# Patient Record
Sex: FEMALE | URBAN - METROPOLITAN AREA
[De-identification: names, ages, dates, MRNs, and addresses within clinical notes are randomized per-mention and may not be internally consistent; named-entity substitution may affect disease eponyms.]

---

## 2024-06-19 ENCOUNTER — APPOINTMENT (RX ONLY)
Dept: URBAN - METROPOLITAN AREA CLINIC 337 | Facility: CLINIC | Age: 29
Setting detail: DERMATOLOGY
End: 2024-06-19

## 2024-06-19 DIAGNOSIS — L82.1 OTHER SEBORRHEIC KERATOSIS: ICD-10-CM

## 2024-06-19 DIAGNOSIS — D18.0 HEMANGIOMA: ICD-10-CM

## 2024-06-19 DIAGNOSIS — L259 CONTACT DERMATITIS AND OTHER ECZEMA, UNSPECIFIED CAUSE: ICD-10-CM | Status: INADEQUATELY CONTROLLED

## 2024-06-19 DIAGNOSIS — D22 MELANOCYTIC NEVI: ICD-10-CM

## 2024-06-19 DIAGNOSIS — L81.4 OTHER MELANIN HYPERPIGMENTATION: ICD-10-CM

## 2024-06-19 PROBLEM — D22.5 MELANOCYTIC NEVI OF TRUNK: Status: ACTIVE | Noted: 2024-06-19

## 2024-06-19 PROBLEM — D18.01 HEMANGIOMA OF SKIN AND SUBCUTANEOUS TISSUE: Status: ACTIVE | Noted: 2024-06-19

## 2024-06-19 PROBLEM — L23.9 ALLERGIC CONTACT DERMATITIS, UNSPECIFIED CAUSE: Status: ACTIVE | Noted: 2024-06-19

## 2024-06-19 PROCEDURE — ? TREATMENT REGIMEN

## 2024-06-19 PROCEDURE — ? COUNSELING

## 2024-06-19 PROCEDURE — ? DEFER

## 2024-06-19 PROCEDURE — ? FULL BODY SKIN EXAM

## 2024-06-19 PROCEDURE — 99203 OFFICE O/P NEW LOW 30 MIN: CPT

## 2024-06-19 PROCEDURE — ? ADDITIONAL NOTES

## 2024-06-19 ASSESSMENT — LOCATION DETAILED DESCRIPTION DERM
LOCATION DETAILED: LEFT DISTAL MEDIAL POSTERIOR UPPER ARM
LOCATION DETAILED: SUPERIOR LUMBAR SPINE
LOCATION DETAILED: SUPERIOR THORACIC SPINE
LOCATION DETAILED: RIGHT DISTAL POSTERIOR UPPER ARM
LOCATION DETAILED: INFERIOR THORACIC SPINE

## 2024-06-19 ASSESSMENT — LOCATION SIMPLE DESCRIPTION DERM
LOCATION SIMPLE: RIGHT POSTERIOR UPPER ARM
LOCATION SIMPLE: LOWER BACK
LOCATION SIMPLE: UPPER BACK
LOCATION SIMPLE: LEFT POSTERIOR UPPER ARM

## 2024-06-19 ASSESSMENT — PAIN INTENSITY VAS: HOW INTENSE IS YOUR PAIN 0 BEING NO PAIN, 10 BEING THE MOST SEVERE PAIN POSSIBLE?: NO PAIN

## 2024-06-19 ASSESSMENT — LOCATION ZONE DERM
LOCATION ZONE: TRUNK
LOCATION ZONE: ARM

## 2024-06-19 ASSESSMENT — ITCH NUMERIC RATING SCALE: HOW SEVERE IS YOUR ITCHING?: 3

## 2024-06-19 ASSESSMENT — SEVERITY ASSESSMENT: SEVERITY: MILD

## 2024-06-19 ASSESSMENT — BSA RASH: BSA RASH: 5

## 2024-06-19 NOTE — PROCEDURE: ADDITIONAL NOTES
Additional Notes: Pt reports after sunscreen application on her arms and face, she started to experience red itchy bumps. Reports today itching is a 2 out 10 and deferred topical steroids. Sunscreen samples given to patient at today's visit. Advised pt to test first on her inner arm, if she experiences another rash then consider patch testing at her next visit.
Render Risk Assessment In Note?: no
Detail Level: Simple

## 2024-06-19 NOTE — PROCEDURE: DEFER
Other Procedure: Allergy testing
Detail Level: Detailed
X Size Of Lesion In Cm (Optional): 0
Introduction Text (Please End With A Colon): The following procedure was deferred:patch testing

## 2024-11-19 NOTE — PROCEDURE: FULL BODY SKIN EXAM
Diabetes Care Specialist Follow-up Note  Author: Marla Stein RD, CDE  Date: 11/19/2024    Intake    Program Intake  Reason for Diabetes Program Visit:: Intervention  Type of Intervention:: Individual  Individual: Device Training  Device Training: Personal CGM  Current diabetes risk level:: high  In the last month, have you used the ER or been admitted to the hospital: No  Permission to speak with others about care:: no    Current Diabetes Treatment: Insulin, DM Injectables, Oral Medications  Oral Medication Type/Dose: farxiga 10 mg once a day  DM Injectables Type/Dose: ozempic 0.5 mg once a week  Method of insulin delivery?: Insulin Device  Insulin Device Type/Dose: humalog via VGo30    Continuous Glucose Monitoring  Patient has CGM: Yes  Personal CGM type:: Dexcom G6    Lab Results   Component Value Date    HGBA1C 8.3 (H) 11/07/2024     A1c Pre Diabetes Care Specialist Intervention:  8.7%    Physical activity/Exercise:   No change    SMBG: using the dexcom G6/G7 - patient has both the G6 and G7 receivers, states she finally got the G7 sensors, in the box the new dexcom G7 prescription is the  not sensors. Patient has a transmitter for the G6 and sensors with a prescription label from 11/23/2023 on them. She is less confused today  She is finally feeling better after having covid  The G6  is not charged, charged and reprogrammed at time of visit, this  does not have recent data  Patient has the new G7  and not the one we used last visit so I am unable to download sensor data to review    Lifestyle Coping Support & Clinical    Lifestyle/Coping/Support  Compared to other people your age, how would you rate your health?: Good  Psychosocial/Coping Skills Assessment Completed: : No  Assessment indicates:: Adequate understanding  Deffered due to:: Other (comment) (previously completed, there has been no change and patient has adequate understanding)  Area of need?: No    Problem 
Price (Do Not Change): 0.00
Review  Active Comorbidities: Cardiovascular Disease, Hypertension, Neuropathy    Diabetes Self-Management Skills Assessment    Medication Skills Assessment  Patient is able to identify current diabetes medications, dosages, and appropriate timing of medications.: yes  Patient reports problems or concerns with current medication regimen.: no  Medication regimen problems/concerns:: lack of training  Medication Skills Assessment Completed:: No  Assessment indicates:: Adequate understanding  Deffered due to:: Other (comment) (previously completed, there has been no change and patient has adequate understanding)  Area of need?: No    Diabetes Disease Process/Treatment Options  Diabetes Type?: Type II  Diabetes Disease Process/Treatment Options: Skills Assessment Completed: No  Assessment indicates:: Adequate understanding  Deferred due to:: Other (comment)  Area of need?: No    Nutrition/Healthy Eating  Meal Plan 24 Hour Recall - Breakfast: Toast and Eggs, coffee with sugar and cream  Meal Plan 24 Hour Recall - Lunch: chicken noodle soup with crackers or a sandwich  Meal Plan 24 Hour Recall - Dinner: yesterday some spaghetti and meatsauce, some times roast and potatoes, some times a burger and some fries, sometimes gets pizza or something home cooked  Meal Plan 24 Hour Recall - Snack: peanut butter crackers, almonds, cheerios  Meal Plan 24 Hour Recall - Beverage: water  Who shops/cooks?: self  Patient can identify foods that impact blood sugar.: yes  Challenges to healthy eating:: portion control, snacking between meals and at night, eating when feeling depressed/stressed, lack of will power, eating out, going to parties  Nutrition/Healthy Eating Skills Assessment Completed:: No  Assessment indicates:: Adequate understanding  Deffered due to:: Other (comment)  Area of need?: No    Physical Activity/Exercise  Physical Activity/Exercise Skills Assessment Completed: : No  Assessment indicates:: Adequate 
Detail Level: Simple
understanding  Deffered due to:: Other (comment)  Area of need?: No    Home Blood Glucose Monitoring  Patient states that blood sugar is checked at home daily.: yes  Monitoring Method:: personal continuous glucose monitor  Personal CGM type:: Dexcom G6  What is your A1c Target?: 7.0 with no hypoglycemia  Home Blood Glucose Monitoring Skills Assessment Completed: : Yes  Assessment indicates:: Instruction Needed  Area of need?: Yes    Acute Complications  Acute Complications Skills Assessment Completed: : No  Assessment indicates:: Adequate understanding  Deffered due to:: Other (comment)  Area of need?: No    Chronic Complications  Chronic Complications Skills Assessment Completed: : No  Assessment indicates:: Adequate understanding  Deferred due to:: Other (comment)  Area of need?: No      During today's follow-up visit,  the following areas required further assessment and content was provided/reviewed.    Based on today's diabetes care assessment, the following areas of need were identified:          7/26/2024    12:01 AM   Areas of Need   Medications/Current Diabetes Treatment No   Lifestyle Coping Support No   Diabetes Disease Process/Treatment Options No   Nutrition/Healthy Eating No   Physical Activity/Exercise No   Home Blood Glucose Monitoring Yes, changed out the dexcom G7 sensor and started a dexcom G7 sensor   Acute Complications No   Chronic Complications No        Today's interventions were provided through individual discussion, instruction, and written materials were provided.    Patient verbalized understanding of instruction and written materials.  Pt was able to return back demonstration of instructions today. Patient understood key points, needs reinforcement and further instruction.     Diabetes Self-Management Care Plan Review and Evaluation of Progress:    During today's follow-up Agustin Diabetes Self-Management Care Plan progress was reviewed and progress was evaluated including his/her 
"input. Aishwarya has agreed to continue his/her journey to improve/maintain overall diabetes control by continuing to set health goals. See care plan progress below.      Care Plan: Diabetes Management   Updates made since 11/20/2023 12:00 AM        Problem: Blood Glucose Self-Monitoring         Goal: Patient agrees to check and record blood sugars 3 times per day using the dexcom G6/G7 for the next 3 months    Start Date: 12/27/2023   Expected End Date: 3/27/2024   This Visit's Progress: On track   Recent Progress: Not met   Priority: High   Barriers: Lack of Supplies   Note:    Patient was instructed how to use a meter. Discussed signs and symptoms, and causes and treatment of hypoglycemia. Covered blood sugar and A1C goals. Instructed patient to use provided logbooks to record blood sugars. Instructed patient to follow-up with their physician. Patient verbalized understanding of all instructions.  Prescription for meter and testing supplies pended to PCP    1/6/2023  Dexcom G6 Education  Patient is here in clinic today for initial start of Dexcom continuous glucose monitoring system (CGMA). Reviewed with patient how to insert sensor and transmitter. Patient inserted sensor on right abdomen and inserted transmitter. Time and date was set on monitor and settings were set. Hypoglycemia trigger set for 70 and hyperglycemia set for 300. Patient provided with phone number for 24-hour help line if needed. Discussed various types of possible alarms and what to do. Questions addressed. Initialization finished. No futher questions.  Patient referred to clinic today for Dexcom G6 continuous glucose sensor system training.  Education was provided using "Quick Start Guide" per Dexcom protocol.    Overview:  5min glucose reading updates, trending arrows, BG graph screens, battery life indicator, Blue Tooth Symbol.  Menus: trend Graph, start sensor, enter BG, events, Alerts, Settings, Shutdown, Stop Sensor   Settings:    "
Instructions: This plan will send the code FBSE to the PM system.  DO NOT or CHANGE the price.
                      * Urgent Low: 55 mg/dL                          * Urgent Low Soon: Off                          * Low alert: 70                          * High alert: 300                          * Rise rate: Off                          * Fall Rate: Off                          * Signal Loss: 30 min                          * No Reading: Off                          * Always sound: On                             Reviewed additional setting options with patient, including Graph Height and Transmitter ID. Transmitter was paired with .    Reviewed where to find sensor insertion time and sensor expiration date.   Discussed no need to calibrate sensor during the entirety of the 10 day wear. Discussed that pt can calibrate sensor if desired, but at that time she will need to continue calibrating every 12 hours for sensor to remain accurate. Reviewed appropriate calibration techniques.  Reviewed sensor site selection. Site selected and prepped using aseptic technique Inserted to left abdomen. Transmitter placed in pod and secured.  Practiced sensor pod/transmitter removal from site, and removal of transmitter from sensor pod.  Patient able to demonstrate without difficulty.  Encouraged to review manual prior to starting another sensor.   Reviewed problem solving aspects of sensor transmission/variables that can disrupt RF transmission.  range 20 feet, but the first 3 hrs keep within 3 feet of transmitter.  Pt instructed on lag time of interstitial fluid from CBG and was advised to tx hypoglycemia and dose insulin based on SMBG values.  Link to video provided and written instructions provided for patient to review before 10 day sensor change  Dexcom technical support contact number given and examples of when to contact them discussed.      1/17 Changing the Sensor (every 10 days)  Remove the whole thing from your body  Hold the sensor by the skinny part and break the clear piece  Remove the gray 
transmitter  In the reader touch start sensor  Touch yes you want to start sensor  Touch the square you want to put the number in for   There will be a 4-digit code on the sensor (like 5937)  Enter the code from the sensor  Touch ok  Touch yes, it is correct  Insert Sensor  Remove orange piece and paper tabs  Place on skin and hold in place  Push the orange button  Insert the gray transmitter into the sensor  Put the small tongue like side in first and push down  You should hear 2 clicks  Touch confirm once sensor is secure on body  Touch start Sensor  Touch 1 then 2  Sensor will take 2 hours to warm up    Changing Transmitter (every 90 days) in the reader  Touch the =   Touch transmitter  Touch pair new  Go through the process to start sensor  Touch yes you want to start sensor  Touch the square you want to put the number in for   There will be a 4-digit code on the sensor (like 5937)  Enter the code from the sensor  Touch ok  Touch yes, it is correct  Touch the square you want to put the number in for   There will be a 6-digit code on the transmitter box (like [id number])  Enter the code from the transmitter box  Touch ok  Touch yes, it is correct  Insert Sensor  Remove orange piece and paper tabs  Place on skin and hold in place  Push the orange button  Insert the gray transmitter into the sensor  Put the small tongue like side in first and push down  You should hear 2 clicks  Touch confirm once sensor is secure on body  Touch start Sensor  Touch 1 then 2  Sensor will take 2 hours to warm up             Follow Up Plan     Follow up in about 10 days (around 11/28/2024) for Change Personal CGM and Upload Data.    Today's care plan and follow up schedule was discussed with patient.  Aishwarya verbalized understanding of the care plan, goals, and agrees to follow up plan.        The patient was encouraged to communicate with his/her health care provider/physician and care team regarding his/her condition(s) and treatment. 
 I provided the patient with my contact information today and encouraged to contact me via phone or [facility] Patient Portal as needed.     Length of Visit   Total Time: 15 Minutes